# Patient Record
Sex: FEMALE | Race: WHITE
[De-identification: names, ages, dates, MRNs, and addresses within clinical notes are randomized per-mention and may not be internally consistent; named-entity substitution may affect disease eponyms.]

---

## 2019-01-20 ENCOUNTER — HOSPITAL ENCOUNTER (EMERGENCY)
Dept: HOSPITAL 60 - LB.ED | Age: 39
Discharge: HOME | End: 2019-01-20
Payer: COMMERCIAL

## 2019-01-20 DIAGNOSIS — S09.90XA: Primary | ICD-10-CM
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
## 2019-01-20 DIAGNOSIS — F10.929: ICD-10-CM

## 2019-01-20 DIAGNOSIS — Z88.0: ICD-10-CM
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
## 2019-01-21 NOTE — CT
UNENHANCED BRAIN CT, 01/20/19



Multislice axial acquisition was performed.  



No priors.  



No masses or mass effect.  



No osseous abnormalities.  



There is mucosal thickening in the ethmoid sinuses consistent with chronic 
sinusitis.  



IMPRESSION: 

No acute intracranial abnormalities.  



[id number]
[facility]